# Patient Record
Sex: FEMALE | Race: WHITE | Employment: UNEMPLOYED | URBAN - METROPOLITAN AREA
[De-identification: names, ages, dates, MRNs, and addresses within clinical notes are randomized per-mention and may not be internally consistent; named-entity substitution may affect disease eponyms.]

---

## 2019-01-23 ENCOUNTER — OFFICE VISIT (OUTPATIENT)
Dept: NEUROLOGY | Age: 69
End: 2019-01-23
Payer: COMMERCIAL

## 2019-01-23 VITALS
WEIGHT: 287.8 LBS | SYSTOLIC BLOOD PRESSURE: 128 MMHG | DIASTOLIC BLOOD PRESSURE: 69 MMHG | HEIGHT: 63 IN | HEART RATE: 79 BPM | BODY MASS INDEX: 50.99 KG/M2

## 2019-01-23 DIAGNOSIS — R20.0 NUMBNESS AND TINGLING OF BOTH LEGS: ICD-10-CM

## 2019-01-23 DIAGNOSIS — M79.2 INTRACTABLE NEUROPATHIC PAIN OF LOWER EXTREMITY, UNSPECIFIED LATERALITY: ICD-10-CM

## 2019-01-23 DIAGNOSIS — R20.2 NUMBNESS AND TINGLING OF BOTH LEGS: ICD-10-CM

## 2019-01-23 DIAGNOSIS — R20.2 NUMBNESS AND TINGLING IN BOTH HANDS: Primary | ICD-10-CM

## 2019-01-23 DIAGNOSIS — R20.0 NUMBNESS AND TINGLING IN BOTH HANDS: Primary | ICD-10-CM

## 2019-01-23 DIAGNOSIS — E11.42 DIABETIC POLYNEUROPATHY ASSOCIATED WITH TYPE 2 DIABETES MELLITUS (HCC): ICD-10-CM

## 2019-01-23 PROCEDURE — 99204 OFFICE O/P NEW MOD 45 MIN: CPT | Performed by: PSYCHIATRY & NEUROLOGY

## 2019-01-23 RX ORDER — LORATADINE 10 MG/1
10 TABLET ORAL DAILY
COMMUNITY
End: 2019-09-24 | Stop reason: ALTCHOICE

## 2019-01-23 RX ORDER — TRAZODONE HYDROCHLORIDE 100 MG/1
100 TABLET ORAL NIGHTLY
COMMUNITY

## 2019-01-23 RX ORDER — POTASSIUM CHLORIDE 750 MG/1
10 CAPSULE, EXTENDED RELEASE ORAL DAILY
COMMUNITY

## 2019-01-23 RX ORDER — TOLTERODINE 2 MG/1
2 CAPSULE, EXTENDED RELEASE ORAL DAILY
COMMUNITY

## 2019-01-23 RX ORDER — SPIRONOLACTONE 50 MG/1
50 TABLET, FILM COATED ORAL 2 TIMES DAILY
COMMUNITY

## 2019-01-23 RX ORDER — GUAIFENESIN, PSEUDOEPHEDRINE HYDROCHLORIDE 600; 60 MG/1; MG/1
1 TABLET, EXTENDED RELEASE ORAL 2 TIMES DAILY PRN
COMMUNITY
End: 2019-09-24 | Stop reason: ALTCHOICE

## 2019-01-23 RX ORDER — FLUTICASONE PROPIONATE 50 MCG
1 SPRAY, SUSPENSION (ML) NASAL DAILY
COMMUNITY

## 2019-01-23 RX ORDER — OMEPRAZOLE 20 MG/1
20 CAPSULE, DELAYED RELEASE ORAL 2 TIMES DAILY
COMMUNITY

## 2019-01-23 RX ORDER — POLYETHYLENE GLYCOL 3350 17 G/17G
17 POWDER, FOR SOLUTION ORAL 2 TIMES DAILY
COMMUNITY

## 2019-01-23 RX ORDER — GABAPENTIN 800 MG/1
600 TABLET ORAL 3 TIMES DAILY
COMMUNITY
End: 2019-07-24 | Stop reason: SDUPTHER

## 2019-01-23 RX ORDER — GUAIFENESIN AND DEXTROMETHORPHAN HYDROBROMIDE 100; 10 MG/5ML; MG/5ML
5 SOLUTION ORAL EVERY 4 HOURS PRN
COMMUNITY
End: 2019-09-24 | Stop reason: ALTCHOICE

## 2019-01-23 RX ORDER — B-COMPLEX WITH VITAMIN C
TABLET ORAL DAILY
COMMUNITY

## 2019-01-23 RX ORDER — LOSARTAN POTASSIUM 100 MG/1
100 TABLET ORAL DAILY
COMMUNITY

## 2019-01-23 RX ORDER — ASPIRIN 81 MG/1
81 TABLET, CHEWABLE ORAL DAILY
COMMUNITY

## 2019-01-23 RX ORDER — DIPHENHYDRAMINE HCL 25 MG
25 CAPSULE ORAL DAILY
COMMUNITY
End: 2019-09-24

## 2019-01-23 RX ORDER — FUROSEMIDE 40 MG/1
40 TABLET ORAL DAILY
COMMUNITY

## 2019-01-23 RX ORDER — DOCUSATE SODIUM 100 MG/1
100 CAPSULE, LIQUID FILLED ORAL DAILY PRN
COMMUNITY

## 2019-01-23 RX ORDER — LANOLIN ALCOHOL/MO/W.PET/CERES
3 CREAM (GRAM) TOPICAL NIGHTLY
COMMUNITY

## 2019-01-23 RX ORDER — CYCLOBENZAPRINE HCL 10 MG
10 TABLET ORAL 3 TIMES DAILY PRN
COMMUNITY

## 2019-01-23 RX ORDER — ACETAMINOPHEN AND CODEINE PHOSPHATE 300; 30 MG/1; MG/1
30-300 TABLET ORAL EVERY 6 HOURS PRN
COMMUNITY

## 2019-01-23 RX ORDER — DULOXETIN HYDROCHLORIDE 30 MG/1
CAPSULE, DELAYED RELEASE ORAL
Qty: 30 CAPSULE | Refills: 0 | Status: SHIPPED | OUTPATIENT
Start: 2019-01-23 | End: 2019-04-26 | Stop reason: SDUPTHER

## 2019-01-23 RX ORDER — NYSTATIN 100000 [USP'U]/G
100000 POWDER TOPICAL 4 TIMES DAILY
COMMUNITY
End: 2019-09-24

## 2019-01-23 RX ORDER — ESCITALOPRAM OXALATE 5 MG/1
10 TABLET ORAL DAILY
COMMUNITY

## 2019-02-07 ENCOUNTER — OFFICE VISIT (OUTPATIENT)
Dept: NEUROLOGY | Age: 69
End: 2019-02-07
Payer: COMMERCIAL

## 2019-02-07 DIAGNOSIS — R20.0 NUMBNESS AND TINGLING IN BOTH HANDS: Primary | ICD-10-CM

## 2019-02-07 DIAGNOSIS — M79.602 PAIN IN BOTH UPPER EXTREMITIES: ICD-10-CM

## 2019-02-07 DIAGNOSIS — M79.601 PAIN IN BOTH UPPER EXTREMITIES: ICD-10-CM

## 2019-02-07 DIAGNOSIS — R20.2 NUMBNESS AND TINGLING IN BOTH HANDS: Primary | ICD-10-CM

## 2019-02-07 PROCEDURE — 95886 MUSC TEST DONE W/N TEST COMP: CPT | Performed by: PSYCHIATRY & NEUROLOGY

## 2019-02-07 PROCEDURE — 95912 NRV CNDJ TEST 11-12 STUDIES: CPT | Performed by: PSYCHIATRY & NEUROLOGY

## 2019-02-14 ENCOUNTER — TELEPHONE (OUTPATIENT)
Dept: NEUROLOGY | Age: 69
End: 2019-02-14

## 2019-04-26 ENCOUNTER — OFFICE VISIT (OUTPATIENT)
Dept: NEUROLOGY | Age: 69
End: 2019-04-26
Payer: COMMERCIAL

## 2019-04-26 VITALS
HEART RATE: 102 BPM | BODY MASS INDEX: 52.81 KG/M2 | DIASTOLIC BLOOD PRESSURE: 70 MMHG | SYSTOLIC BLOOD PRESSURE: 130 MMHG | WEIGHT: 287 LBS | HEIGHT: 62 IN

## 2019-04-26 DIAGNOSIS — E11.42 DIABETIC POLYNEUROPATHY ASSOCIATED WITH TYPE 2 DIABETES MELLITUS (HCC): Primary | ICD-10-CM

## 2019-04-26 DIAGNOSIS — G56.03 BILATERAL CARPAL TUNNEL SYNDROME: ICD-10-CM

## 2019-04-26 PROCEDURE — 2022F DILAT RTA XM EVC RTNOPTHY: CPT | Performed by: PSYCHIATRY & NEUROLOGY

## 2019-04-26 PROCEDURE — 99214 OFFICE O/P EST MOD 30 MIN: CPT | Performed by: PSYCHIATRY & NEUROLOGY

## 2019-04-26 PROCEDURE — G8400 PT W/DXA NO RESULTS DOC: HCPCS | Performed by: PSYCHIATRY & NEUROLOGY

## 2019-04-26 PROCEDURE — 4040F PNEUMOC VAC/ADMIN/RCVD: CPT | Performed by: PSYCHIATRY & NEUROLOGY

## 2019-04-26 PROCEDURE — G8417 CALC BMI ABV UP PARAM F/U: HCPCS | Performed by: PSYCHIATRY & NEUROLOGY

## 2019-04-26 PROCEDURE — G8427 DOCREV CUR MEDS BY ELIG CLIN: HCPCS | Performed by: PSYCHIATRY & NEUROLOGY

## 2019-04-26 PROCEDURE — 1123F ACP DISCUSS/DSCN MKR DOCD: CPT | Performed by: PSYCHIATRY & NEUROLOGY

## 2019-04-26 PROCEDURE — 3046F HEMOGLOBIN A1C LEVEL >9.0%: CPT | Performed by: PSYCHIATRY & NEUROLOGY

## 2019-04-26 PROCEDURE — 1036F TOBACCO NON-USER: CPT | Performed by: PSYCHIATRY & NEUROLOGY

## 2019-04-26 PROCEDURE — 1090F PRES/ABSN URINE INCON ASSESS: CPT | Performed by: PSYCHIATRY & NEUROLOGY

## 2019-04-26 PROCEDURE — 3017F COLORECTAL CA SCREEN DOC REV: CPT | Performed by: PSYCHIATRY & NEUROLOGY

## 2019-04-26 RX ORDER — DULOXETIN HYDROCHLORIDE 60 MG/1
CAPSULE, DELAYED RELEASE ORAL
Qty: 30 CAPSULE | Refills: 1 | Status: SHIPPED | OUTPATIENT
Start: 2019-04-26 | End: 2019-07-24 | Stop reason: SDUPTHER

## 2019-04-26 RX ORDER — PETROLATUM,WHITE/LANOLIN
OINTMENT (GRAM) TOPICAL 2 TIMES DAILY
COMMUNITY

## 2019-04-26 RX ORDER — NAPROXEN 375 MG/1
375 TABLET, DELAYED RELEASE ORAL 2 TIMES DAILY WITH MEALS
COMMUNITY

## 2019-04-26 NOTE — PROGRESS NOTES
NEUROLOGY FOLLOW UP VISIT  Patient Name:       Ramon Vieyra  :        1950  Clinic Visit Date:    2019    Dear Dr. Kat Conklin, DO     I saw Ms. Ramon Vieyra in follow-up in the office today in continuation of neurologic care. As you know she  is a 79 y.o. right handed  female initially seen in neurology consultation on 19 for c/o  numbness and tingling along with pain in lower extremities extending upwards including both hands right greater than left for the past \"patient couple of years\". She comes to the clinic today for follow up on EMG study of bilateral lower extremities. She also has been having increasing nocturnal awakenings. She has had carpal tunnel release in the past along with the left ulnar nerve transposition. Also admits to neck and lower back pain but denied radiating pain along upper or lower extremities. She also stated that she was diagnosed with diabetic neuropathy 16 years ago and she had insulin-dependent diabetes since early . She has been on gabapentin 800 mg tid with no relief. She tried Lyrica in the past without any help. she is presently on Tylenol # 3 with \"some help\". She also has been having spasms/ cramps in both calves and Flexeril gives her moderate relief. She also has been having progressive balance difficulties and she had multiple falls. Denied head injuries and loss of consciousness. She also c/o intermittent jerking sensation involving both upper and lower extremities and at times associated with increased sleepiness. Denied syncopal episodes. Review of systems done by staff reviewed and pertinent positives include numbness, painful sensations, joint pain, neck pain, back pain, balance difficulties, anxiety and depression. Denies suicidal ideation.     Current Outpatient Medications on File Prior to Visit   Medication Sig Dispense Refill    folic acid-pyridoxine-cyanocobalamine (FOLTX) 2.5-25-1 MG TABS tablet Take 1

## 2019-04-26 NOTE — PROGRESS NOTES
NEUROLOGY FOLLOW-UP  Patient Name:  Lucian Victoria  :   1950  Clinic Visit Date: 2019        REVIEW OF SYSTEMS  Constitutional Weight changes: present, Fevers : present, Fatigue: present; Any recent hospitalizations:  present.    HEENT Ears: pain, ringing, diminished Eyes: glasses, Visual disturbance: present   Reespiratory Shortness of breath: present, Cough: present   Cardivascular Chest pain: present, Leg swelling :present   GI Constipation: present, Diarrhea: absent, Swallowing change: present    Urinary frequency: present, Urinary urgency: present, Urinary incontinence: absent   Musculoskeletal Neck pain: present, Back pain: present, Stiffness: present, Muscle pain: present, Joint pain: present   Dermatological Hair loss: absent, Skin changes: absent   Neurological Memory loss: present, Confusion: present, Seizures: absent; Trouble walking or imbalance: present, Dizziness: present, Weakness: present, Numbness present, Tremor: absent, Spasm: present, Speech difficulty: present, Headache: present, Light sensitivity: present   Psychiatric Anxiety: present, Depression  present, Suicidal ideations absent   Hematologic Abnormal bleeding: absent, Anemia: absent, Clotting disorder: absent, Lymph gland changes: absent

## 2019-07-24 ENCOUNTER — TELEPHONE (OUTPATIENT)
Dept: NEUROLOGY | Age: 69
End: 2019-07-24

## 2019-07-24 ENCOUNTER — OFFICE VISIT (OUTPATIENT)
Dept: NEUROLOGY | Age: 69
End: 2019-07-24
Payer: COMMERCIAL

## 2019-07-24 VITALS
HEART RATE: 89 BPM | BODY MASS INDEX: 53.92 KG/M2 | WEIGHT: 293 LBS | HEIGHT: 62 IN | SYSTOLIC BLOOD PRESSURE: 125 MMHG | DIASTOLIC BLOOD PRESSURE: 67 MMHG

## 2019-07-24 DIAGNOSIS — R20.2 NUMBNESS AND TINGLING IN BOTH HANDS: ICD-10-CM

## 2019-07-24 DIAGNOSIS — R20.0 NUMBNESS AND TINGLING OF BOTH LEGS: ICD-10-CM

## 2019-07-24 DIAGNOSIS — G56.03 BILATERAL CARPAL TUNNEL SYNDROME: ICD-10-CM

## 2019-07-24 DIAGNOSIS — R20.0 NUMBNESS AND TINGLING IN BOTH HANDS: ICD-10-CM

## 2019-07-24 DIAGNOSIS — E11.42 DIABETIC POLYNEUROPATHY ASSOCIATED WITH TYPE 2 DIABETES MELLITUS (HCC): Primary | ICD-10-CM

## 2019-07-24 DIAGNOSIS — M79.2 INTRACTABLE NEUROPATHIC PAIN OF LOWER EXTREMITY, UNSPECIFIED LATERALITY: ICD-10-CM

## 2019-07-24 DIAGNOSIS — R20.2 NUMBNESS AND TINGLING OF BOTH LEGS: ICD-10-CM

## 2019-07-24 PROCEDURE — 99214 OFFICE O/P EST MOD 30 MIN: CPT | Performed by: PSYCHIATRY & NEUROLOGY

## 2019-07-24 PROCEDURE — G8427 DOCREV CUR MEDS BY ELIG CLIN: HCPCS | Performed by: PSYCHIATRY & NEUROLOGY

## 2019-07-24 PROCEDURE — 1123F ACP DISCUSS/DSCN MKR DOCD: CPT | Performed by: PSYCHIATRY & NEUROLOGY

## 2019-07-24 PROCEDURE — 2022F DILAT RTA XM EVC RTNOPTHY: CPT | Performed by: PSYCHIATRY & NEUROLOGY

## 2019-07-24 PROCEDURE — G8417 CALC BMI ABV UP PARAM F/U: HCPCS | Performed by: PSYCHIATRY & NEUROLOGY

## 2019-07-24 PROCEDURE — 3046F HEMOGLOBIN A1C LEVEL >9.0%: CPT | Performed by: PSYCHIATRY & NEUROLOGY

## 2019-07-24 PROCEDURE — G8400 PT W/DXA NO RESULTS DOC: HCPCS | Performed by: PSYCHIATRY & NEUROLOGY

## 2019-07-24 PROCEDURE — 3017F COLORECTAL CA SCREEN DOC REV: CPT | Performed by: PSYCHIATRY & NEUROLOGY

## 2019-07-24 PROCEDURE — 4040F PNEUMOC VAC/ADMIN/RCVD: CPT | Performed by: PSYCHIATRY & NEUROLOGY

## 2019-07-24 PROCEDURE — 1090F PRES/ABSN URINE INCON ASSESS: CPT | Performed by: PSYCHIATRY & NEUROLOGY

## 2019-07-24 PROCEDURE — 1036F TOBACCO NON-USER: CPT | Performed by: PSYCHIATRY & NEUROLOGY

## 2019-07-24 RX ORDER — DULOXETIN HYDROCHLORIDE 60 MG/1
CAPSULE, DELAYED RELEASE ORAL
Qty: 30 CAPSULE | Refills: 1 | Status: SHIPPED | OUTPATIENT
Start: 2019-07-24 | End: 2019-07-30

## 2019-07-24 RX ORDER — BUPROPION HYDROCHLORIDE 300 MG/1
300 TABLET ORAL EVERY MORNING
COMMUNITY

## 2019-07-24 RX ORDER — MECLIZINE HYDROCHLORIDE 25 MG/1
25 TABLET ORAL 3 TIMES DAILY PRN
COMMUNITY

## 2019-07-24 RX ORDER — GABAPENTIN 600 MG/1
TABLET ORAL
Qty: 90 TABLET | Refills: 3 | Status: SHIPPED | OUTPATIENT
Start: 2019-07-24 | End: 2020-02-11 | Stop reason: SDUPTHER

## 2019-07-24 RX ORDER — PREDNISONE 1 MG/1
5 TABLET ORAL DAILY
COMMUNITY

## 2019-07-24 RX ORDER — TERBINAFINE HYDROCHLORIDE 250 MG/1
250 TABLET ORAL DAILY
COMMUNITY
End: 2019-09-24

## 2019-07-24 NOTE — PROGRESS NOTES
Dispense Refill    meclizine (ANTIVERT) 25 MG tablet Take 25 mg by mouth 3 times daily as needed      methotrexate (RHEUMATREX) 2.5 MG chemo tablet Take 2.5 mg by mouth once a week Indications: take six tablets once weekly      predniSONE (DELTASONE) 5 MG tablet Take 5 mg by mouth 3 times daily (with meals)      terbinafine (LAMISIL) 250 MG tablet Take 250 mg by mouth daily      buPROPion (WELLBUTRIN XL) 300 MG extended release tablet Take 300 mg by mouth every morning      folic acid-pyridoxine-cyanocobalamine (FOLTX) 2.5-25-1 MG TABS tablet Take 1 tablet by mouth daily      sodium chloride (OCEAN) 0.65 % nasal spray 1 spray by Nasal route as needed for Congestion      Naproxen (EC NAPROSYN) 375 MG EC tablet Take 375 mg by mouth 2 times daily (with meals)      insulin glargine (BASAGLAR KWIKPEN) 100 UNIT/ML injection pen Inject into the skin nightly      DULoxetine (CYMBALTA) 60 MG extended release capsule Take one cap once daily 30 capsule 1    aspirin 81 MG tablet Take 81 mg by mouth daily      cyclobenzaprine (FLEXERIL) 10 MG tablet Take 10 mg by mouth 3 times daily as needed      escitalopram (LEXAPRO) 5 MG tablet Take 5 mg by mouth daily      fluticasone (FLONASE) 50 MCG/ACT nasal spray 50 sprays by Nasal route 2 times daily      furosemide (LASIX) 40 MG tablet Take 40 mg by mouth daily      gabapentin (NEURONTIN) 800 MG tablet Take 600 mg by mouth 3 times daily.        insulin lispro (HUMALOG KWIKPEN) 100 UNIT/ML pen Inject 100 Units into the skin 2 times daily      adalimumab (HUMIRA PEN) 40 MG/0.8ML injection Inject 0.8 mLs into the skin daily      losartan (COZAAR) 100 MG tablet Take 100 mg by mouth daily      polyethylene glycol (GLYCOLAX) powder Take 17 g by mouth daily      nystatin (NYAMYC) 589325 UNIT/GM powder Apply 100,000 g topically 2 times daily      omeprazole 20 MG EC tablet Take 20 mg by mouth 2 times daily      potassium chloride (MICRO-K) 10 MEQ extended release capsule Take 10 mEq by mouth daily      spironolactone (ALDACTONE) 50 MG tablet Take 50 mg by mouth 2 times daily      traZODone (DESYREL) 100 MG tablet Take 100 mg by mouth daily      acetaminophen-codeine (TYLENOL #3) 300-30 MG per tablet Take  mg by mouth every 6 hours as needed. Óscar Mendez Liraglutide (VICTOZA) 18 MG/3ML SOPN SC injection Inject 18 mg into the skin daily      diclofenac sodium 1 % GEL Apply 1 g topically 2 times daily      loratadine (CLARITIN) 10 MG tablet Take 10 mg by mouth daily      B Complex Vitamins (VITAMIN B COMPLEX) TABS Take by mouth daily      Dextromethorphan-guaiFENesin  MG/5ML SYRP Take 5 mLs by mouth every 4 hours as needed for Cough      docusate sodium (COLACE) 100 MG capsule Take 100 mg by mouth daily as needed for Constipation      melatonin 3 MG TABS tablet Take 3 mg by mouth nightly as needed      pseudoephedrine-guaiFENesin (MUCINEX D)  MG per extended release tablet Take 1 tablet by mouth 2 times daily as needed for Congestion      Vitamins A & D (VITAMIN A & D) ointment Apply topically as needed for Dry Skin Apply topically as needed.  diphenhydrAMINE (BANOPHEN) 25 MG capsule Take 25 mg by mouth daily      tolterodine (DETROL LA) 2 MG extended release capsule Take 2 mg by mouth daily       No current facility-administered medications on file prior to visit. Allergies: Maribel Reyes is allergic to adhesive tape; alcohol; benzoyl peroxide; metformin and related; penicillins; seasonal; vancomycin; and vicodin [hydrocodone-acetaminophen].     Past Medical History:   Diagnosis Date    Acute hypokalemia     Anxiety     Chronic pain syndrome     Esophageal reflux     Hypertension     Insomnia     Major depressive disorder     Melena     Mumps 03/2019    Osteoporosis     bilateral knees    Overactive bladder     Psoriasis     Psoriasis     STEMI (ST elevation myocardial infarction) (HCC)     Type 2 diabetes mellitus ([facility] [address]        History reviewed. No pertinent surgical history. Social History: Marshal Welch  reports that she quit smoking about 7 years ago. She has never used smokeless tobacco. She reports that she does not drink alcohol or use drugs. Family History   Problem Relation Age of Onset    Hypertension Mother     Heart Disease Mother    Keary Roup Migraines Mother     Hypertension Father     Heart Disease Father     Hypertension Sister     Heart Disease Sister     Migraines Sister     Hypertension Brother     Heart Disease Brother     Cancer Brother     Migraines Maternal Grandmother     Hypertension Maternal Grandmother     Heart Disease Maternal Grandmother     Migraines Maternal Grandfather     Hypertension Maternal Grandfather     Heart Disease Maternal Grandfather        On exam: Blood pressure 125/67, pulse 89, height 5' 2\" (1.575 m), weight 294 lb 6.4 oz (133.5 kg). GENERAL  Appears comfortable and in no distress   HEENT  NC/ AT   NECK  Supple and no bruits heard   MENTAL STATUS:  Alert, oriented, intact memory, no confusion, normal speech, normal language, no hallucination or delusion   CRANIAL NERVES: II     -      PERRLA, Fundi reveal intact venous pulsations;  Visual fields intact to confrontation  III,IV,VI -  EOMs full, no afferent defect, no                      ELIS, no ptosis  V     -     Normal facial sensation  VII    -     Normal facial symmetry  VIII   -     Intact hearing  IX,X -     Symmetrical palate  XI    -     Symmetrical shoulder shrug  XII   -     Midline tongue, no atrophy    MOTOR FUNCTION:  significant for good strength of grade 5/5 in bilateral proximal and distal muscle groups of both upper and lower extremities with normal bulk, normal tone and no involuntary movements, no tremor   SENSORY FUNCTION:  Impaired light touch, pinprick in distal lower extremities and upper extremities in stocking - global pattern    CEREBELLAR FUNCTION:  Intact fine motor control over upper limbs   REFLEX FUNCTION:

## 2019-09-24 ENCOUNTER — TELEPHONE (OUTPATIENT)
Dept: NEUROLOGY | Age: 69
End: 2019-09-24

## 2019-09-24 ENCOUNTER — OFFICE VISIT (OUTPATIENT)
Dept: NEUROLOGY | Age: 69
End: 2019-09-24
Payer: COMMERCIAL

## 2019-09-24 ENCOUNTER — HOSPITAL ENCOUNTER (EMERGENCY)
Age: 69
Discharge: HOME | End: 2019-09-24
Attending: EMERGENCY MEDICINE
Payer: COMMERCIAL

## 2019-09-24 VITALS
WEIGHT: 293 LBS | BODY MASS INDEX: 53.92 KG/M2 | HEART RATE: 79 BPM | SYSTOLIC BLOOD PRESSURE: 130 MMHG | DIASTOLIC BLOOD PRESSURE: 64 MMHG | HEIGHT: 62 IN

## 2019-09-24 VITALS
SYSTOLIC BLOOD PRESSURE: 130 MMHG | TEMPERATURE: 98.2 F | WEIGHT: 293 LBS | BODY MASS INDEX: 53.92 KG/M2 | RESPIRATION RATE: 20 BRPM | DIASTOLIC BLOOD PRESSURE: 54 MMHG | OXYGEN SATURATION: 95 % | HEART RATE: 100 BPM | HEIGHT: 62 IN

## 2019-09-24 DIAGNOSIS — I82.412 ACUTE DEEP VEIN THROMBOSIS (DVT) OF FEMORAL VEIN OF LEFT LOWER EXTREMITY (HCC): Primary | ICD-10-CM

## 2019-09-24 DIAGNOSIS — R20.0 NUMBNESS AND TINGLING IN BOTH HANDS: ICD-10-CM

## 2019-09-24 DIAGNOSIS — M79.2 INTRACTABLE NEUROPATHIC PAIN OF LOWER EXTREMITY, UNSPECIFIED LATERALITY: Primary | ICD-10-CM

## 2019-09-24 DIAGNOSIS — E11.42 DIABETIC POLYNEUROPATHY ASSOCIATED WITH TYPE 2 DIABETES MELLITUS (HCC): ICD-10-CM

## 2019-09-24 DIAGNOSIS — R20.2 NUMBNESS AND TINGLING IN BOTH HANDS: ICD-10-CM

## 2019-09-24 DIAGNOSIS — G56.03 BILATERAL CARPAL TUNNEL SYNDROME: ICD-10-CM

## 2019-09-24 LAB
ABSOLUTE EOS #: 0.04 K/UL (ref 0–0.44)
ABSOLUTE IMMATURE GRANULOCYTE: 0.19 K/UL (ref 0–0.3)
ABSOLUTE LYMPH #: 1.24 K/UL (ref 1.1–3.7)
ABSOLUTE MONO #: 0.94 K/UL (ref 0.1–1.2)
ANION GAP SERPL CALCULATED.3IONS-SCNC: 14 MMOL/L (ref 9–17)
BASOPHILS # BLD: 0 % (ref 0–2)
BASOPHILS ABSOLUTE: 0.03 K/UL (ref 0–0.2)
BUN BLDV-MCNC: 25 MG/DL (ref 8–23)
BUN/CREAT BLD: 28 (ref 9–20)
CALCIUM SERPL-MCNC: 9.4 MG/DL (ref 8.6–10.4)
CHLORIDE BLD-SCNC: 100 MMOL/L (ref 98–107)
CO2: 25 MMOL/L (ref 20–31)
CREAT SERPL-MCNC: 0.9 MG/DL (ref 0.5–0.9)
DIFFERENTIAL TYPE: ABNORMAL
EOSINOPHILS RELATIVE PERCENT: 0 % (ref 1–4)
GFR AFRICAN AMERICAN: >60 ML/MIN
GFR NON-AFRICAN AMERICAN: >60 ML/MIN
GFR SERPL CREATININE-BSD FRML MDRD: ABNORMAL ML/MIN/{1.73_M2}
GFR SERPL CREATININE-BSD FRML MDRD: ABNORMAL ML/MIN/{1.73_M2}
GLUCOSE BLD-MCNC: 252 MG/DL (ref 70–99)
HCT VFR BLD CALC: 41.3 % (ref 36.3–47.1)
HEMOGLOBIN: 13 G/DL (ref 11.9–15.1)
IMMATURE GRANULOCYTES: 2 %
LYMPHOCYTES # BLD: 10 % (ref 24–43)
MCH RBC QN AUTO: 33.4 PG (ref 25.2–33.5)
MCHC RBC AUTO-ENTMCNC: 31.5 G/DL (ref 28.4–34.8)
MCV RBC AUTO: 106.2 FL (ref 82.6–102.9)
MONOCYTES # BLD: 7 % (ref 3–12)
NRBC AUTOMATED: ABNORMAL PER 100 WBC
PDW BLD-RTO: 15.8 % (ref 11.8–14.4)
PLATELET # BLD: 167 K/UL (ref 138–453)
PLATELET ESTIMATE: ABNORMAL
PMV BLD AUTO: 11.5 FL (ref 8.1–13.5)
POTASSIUM SERPL-SCNC: 5 MMOL/L (ref 3.7–5.3)
RBC # BLD: 3.89 M/UL (ref 3.95–5.11)
RBC # BLD: ABNORMAL 10*6/UL
SEG NEUTROPHILS: 81 % (ref 36–65)
SEGMENTED NEUTROPHILS ABSOLUTE COUNT: 10.56 K/UL (ref 1.5–8.1)
SODIUM BLD-SCNC: 139 MMOL/L (ref 135–144)
WBC # BLD: 13 K/UL (ref 3.5–11.3)
WBC # BLD: ABNORMAL 10*3/UL

## 2019-09-24 PROCEDURE — 80048 BASIC METABOLIC PNL TOTAL CA: CPT

## 2019-09-24 PROCEDURE — 2022F DILAT RTA XM EVC RTNOPTHY: CPT | Performed by: PSYCHIATRY & NEUROLOGY

## 2019-09-24 PROCEDURE — 85025 COMPLETE CBC W/AUTO DIFF WBC: CPT

## 2019-09-24 PROCEDURE — 6370000000 HC RX 637 (ALT 250 FOR IP): Performed by: EMERGENCY MEDICINE

## 2019-09-24 PROCEDURE — 99214 OFFICE O/P EST MOD 30 MIN: CPT | Performed by: PSYCHIATRY & NEUROLOGY

## 2019-09-24 PROCEDURE — 1090F PRES/ABSN URINE INCON ASSESS: CPT | Performed by: PSYCHIATRY & NEUROLOGY

## 2019-09-24 PROCEDURE — 3017F COLORECTAL CA SCREEN DOC REV: CPT | Performed by: PSYCHIATRY & NEUROLOGY

## 2019-09-24 PROCEDURE — 1123F ACP DISCUSS/DSCN MKR DOCD: CPT | Performed by: PSYCHIATRY & NEUROLOGY

## 2019-09-24 PROCEDURE — G8417 CALC BMI ABV UP PARAM F/U: HCPCS | Performed by: PSYCHIATRY & NEUROLOGY

## 2019-09-24 PROCEDURE — 6370000000 HC RX 637 (ALT 250 FOR IP): Performed by: PHYSICIAN ASSISTANT

## 2019-09-24 PROCEDURE — 99284 EMERGENCY DEPT VISIT MOD MDM: CPT

## 2019-09-24 PROCEDURE — 1036F TOBACCO NON-USER: CPT | Performed by: PSYCHIATRY & NEUROLOGY

## 2019-09-24 PROCEDURE — 4040F PNEUMOC VAC/ADMIN/RCVD: CPT | Performed by: PSYCHIATRY & NEUROLOGY

## 2019-09-24 PROCEDURE — G8427 DOCREV CUR MEDS BY ELIG CLIN: HCPCS | Performed by: PSYCHIATRY & NEUROLOGY

## 2019-09-24 PROCEDURE — 3046F HEMOGLOBIN A1C LEVEL >9.0%: CPT | Performed by: PSYCHIATRY & NEUROLOGY

## 2019-09-24 PROCEDURE — G8400 PT W/DXA NO RESULTS DOC: HCPCS | Performed by: PSYCHIATRY & NEUROLOGY

## 2019-09-24 RX ORDER — TRIAMCINOLONE ACETONIDE 1 MG/G
CREAM TOPICAL 2 TIMES DAILY
COMMUNITY

## 2019-09-24 RX ORDER — FOLIC ACID 1 MG/1
1 TABLET ORAL DAILY
COMMUNITY

## 2019-09-24 RX ORDER — PROPRANOLOL HYDROCHLORIDE 40 MG/1
40 TABLET ORAL DAILY PRN
COMMUNITY

## 2019-09-24 RX ORDER — FLUCONAZOLE 100 MG/1
100 TABLET ORAL DAILY
COMMUNITY

## 2019-09-24 RX ORDER — NYSTATIN 100000 U/G
CREAM TOPICAL 2 TIMES DAILY
COMMUNITY

## 2019-09-24 RX ORDER — MECLIZINE HCL 12.5 MG/1
25 TABLET ORAL ONCE
Status: COMPLETED | OUTPATIENT
Start: 2019-09-24 | End: 2019-09-24

## 2019-09-24 RX ORDER — LORATADINE 10 MG/1
10 TABLET ORAL DAILY
COMMUNITY

## 2019-09-24 RX ADMIN — RIVAROXABAN 15 MG: 15 TABLET, FILM COATED ORAL at 13:22

## 2019-09-24 RX ADMIN — MECLIZINE 25 MG: 12.5 TABLET ORAL at 13:22

## 2019-09-24 ASSESSMENT — PAIN SCALES - GENERAL: PAINLEVEL_OUTOF10: 9

## 2019-09-24 NOTE — ED PROVIDER NOTES
EMERGENCY DEPARTMENT ENCOUNTER   ATTENDING ATTESTATION     Pt Name: Diego Iglesias  MRN: [id number]  [place] 1950  Date of evaluation: 9/24/19   Diego Iglesias is a 81 y.o. female with CC: Leg Pain (DVT in left leg; sent from [facility])    MDM:   Presents for DVT in the left leg. Had appointment with Dr Mariam Hay for worsening leg pain. Follows with him for DM neuropathy. Notified by Nursing Home just before leaving that she was positive for DVT. Has chronic SOB without change. Previously on Coumadin. Taken off due to recurrent toxicity (elevated INR). Denies h/o major GI bleed. B/L lower leg tenderness with distal erythema. More tenderness on left. Received pre-arrival notification by Dr Mariam Hay. He d/w patient's PCP. Recommended evaluation in nearest ED. Outside venous duplex US report obtained. Positive for acute DVT in left superficial femoral vein. No DVT on right. Denies contraindication to anticoagulation. Consider DOAC if covered by Insurance. Pharmacy consult placed. Patient released on Xarelto. Risks and benefits d/w patient and family member         CRITICAL CARE:       EKG: All EKG's are interpreted by the Emergency Department Physician who either signs or Co-signs this chart in the absence of a cardiologist.      RADIOLOGY:All plain film, CT, MRI, and formal ultrasound images (except ED bedside ultrasound) are read by the radiologist, see reports below, unless otherwise noted in MDM or here. No orders to display     LABS: All lab results were reviewed by myself, and all abnormals are listed below.   Labs Reviewed   CBC WITH AUTO DIFFERENTIAL - Abnormal; Notable for the following components:       Result Value    WBC 13.0 (*)     RBC 3.89 (*)     .2 (*)     RDW 15.8 (*)     Seg Neutrophils 81 (*)     Lymphocytes 10 (*)     Eosinophils % 0 (*)     Immature Granulocytes 2 (*)     Segs Absolute 10.56 (*)     All other components within normal limits   BASIC METABOLIC PANEL - Abnormal;
mouth 2 times daily     POLYETHYLENE GLYCOL (GLYCOLAX) POWDER    Take 17 g by mouth daily    POTASSIUM CHLORIDE (MICRO-K) 10 MEQ EXTENDED RELEASE CAPSULE    Take 10 mEq by mouth daily    PREDNISONE (DELTASONE) 5 MG TABLET    Take 10 mg by mouth daily     PROPRANOLOL (INDERAL) 40 MG TABLET    Take 40 mg by mouth daily as needed (For tremors) For tremors    SODIUM CHLORIDE (OCEAN) 0.65 % NASAL SPRAY    1 spray by Nasal route as needed for Congestion    SPIRONOLACTONE (ALDACTONE) 50 MG TABLET    Take 50 mg by mouth 2 times daily    TOLTERODINE (DETROL LA) 2 MG EXTENDED RELEASE CAPSULE    Take 2 mg by mouth daily    TRAZODONE (DESYREL) 100 MG TABLET    Take 100 mg by mouth nightly     TRIAMCINOLONE (KENALOG) 0.1 % CREAM    Apply topically 2 times daily Apply topically 2 times daily under breast and skin folds. Mix with nystatin cream.    VITAMINS A & D (VITAMIN A & D) OINTMENT    Apply topically 2 times daily Apply to arms and back       PAST MEDICAL HISTORY         Diagnosis Date    Acute hypokalemia     Anxiety     Chronic pain syndrome     Esophageal reflux     Hypertension     Insomnia     Major depressive disorder     Melena     Mumps 03/2019    Osteoporosis     bilateral knees    Overactive bladder     Psoriasis     Psoriasis     [facility]     Type 2 diabetes mellitus ([facility] [address]        SURGICAL HISTORY     History reviewed. No pertinent surgical history.       FAMILY HISTORY           Problem Relation Age of Onset    Hypertension Mother     Heart Disease Mother    [facility] Migraines Mother     Hypertension Father     Heart Disease Father     Hypertension Sister     Heart Disease Sister     Migraines Sister     Hypertension Brother     Heart Disease Brother     Cancer Brother     Migraines Maternal Grandmother     Hypertension Maternal Grandmother     Heart Disease Maternal Grandmother     Migraines Maternal Grandfather     Hypertension Maternal Grandfather

## 2019-09-24 NOTE — PROGRESS NOTES
NEUROLOGY FOLLOW UP VISIT  Patient Name:       Jw Anthony  :        1950  Clinic Visit Date:    2019    Dear Dr. Kaushal Peña, DO     I saw Ms. Jw Anthony in follow-up in the office today in continuation of neurologic care. As you know she  is a 81 y.o. right handed  female with uncontrolled diabetic neuropathic pain. Since the last visit on 19, spoke to nurse, Megha Garcia at [facility]. Patient was hospitalized in [facility] in 2019 for suicidal ideations. Since then she has not been getting Duloxetine. She is brought to the clinic today for bilateral lower extremity weakness. She had doppler of lower extremities and this am just before coming to this clinic she was told to have DVT in left lower extremity. She was just told about it and she did not bring the report with her. She also stated that she had allergic rash (without SOB) with use of amitriptyline and baclofen and ketamine cream and she used it only for one day and immediately stopped using it. Since then rash has been improving. She also stated that she has neck pain and she is on schedule for MRI cervical spine (w/wo) at [facility] on 19. Of note; she was initially seen here in neurology consultation on 19 for c/o  numbness and tingling along with pain in lower extremities extending upwards including both hands right greater than left for the past \"patient couple of years\". She comes to clinic stating that gabapentin and flexeril has been helping some for her abnormal pain in bilateral lower extremities. Also admits to neck and lower back pain but denied radiating pain along upper or lower extremities. She also stated that she was diagnosed with diabetic neuropathy 16 years ago and she had insulin-dependent diabetes since early . She has been on gabapentin 800 mg tid with no relief.   She tried Lyrica in the past without any help. she is presently on Tylenol # 3 with times daily as needed      escitalopram (LEXAPRO) 5 MG tablet Take 5 mg by mouth daily      furosemide (LASIX) 40 MG tablet Take 40 mg by mouth daily      insulin lispro (HUMALOG KWIKPEN) 100 UNIT/ML pen Inject 100 Units into the skin 2 times daily      adalimumab (HUMIRA PEN) 40 MG/0.8ML injection Inject 0.8 mLs into the skin daily      polyethylene glycol (GLYCOLAX) powder Take 17 g by mouth daily      nystatin (NYAMYC) 891147 UNIT/GM powder Apply 100,000 g topically 2 times daily      omeprazole 20 MG EC tablet Take 20 mg by mouth 2 times daily      potassium chloride (MICRO-K) 10 MEQ extended release capsule Take 10 mEq by mouth daily      spironolactone (ALDACTONE) 50 MG tablet Take 50 mg by mouth 2 times daily      traZODone (DESYREL) 100 MG tablet Take 100 mg by mouth daily      acetaminophen-codeine (TYLENOL #3) 300-30 MG per tablet Take  mg by mouth every 6 hours as needed. Lucas Polanco Liraglutide (VICTOZA) 18 MG/3ML SOPN SC injection Inject 18 mg into the skin daily      diclofenac sodium 1 % GEL Apply 1 g topically 2 times daily      B Complex Vitamins (VITAMIN B COMPLEX) TABS Take by mouth daily      docusate sodium (COLACE) 100 MG capsule Take 100 mg by mouth daily as needed for Constipation      melatonin 3 MG TABS tablet Take 3 mg by mouth nightly as needed      meclizine (ANTIVERT) 25 MG tablet Take 25 mg by mouth 3 times daily as needed      terbinafine (LAMISIL) 250 MG tablet Take 250 mg by mouth daily      folic acid-pyridoxine-cyanocobalamine (FOLTX) 2.5-25-1 MG TABS tablet Take 1 tablet by mouth daily      diphenhydrAMINE (BANOPHEN) 25 MG capsule Take 25 mg by mouth daily      fluticasone (FLONASE) 50 MCG/ACT nasal spray 50 sprays by Nasal route 2 times daily      losartan (COZAAR) 100 MG tablet Take 100 mg by mouth daily      tolterodine (DETROL LA) 2 MG extended release capsule Take 2 mg by mouth daily       No current facility-administered medications on file prior to sensation  VII    -     Normal facial symmetry  VIII   -     Intact hearing  IX,X -     Symmetrical palate  XI    -     Symmetrical shoulder shrug  XII   -     Midline tongue, no atrophy    MOTOR FUNCTION:  significant for limited motor exam in bilateral lower extremities due to tenderness in both lower legs; otherwise good strength of grade 5/5 in bilateral proximal and distal muscle groups of both upper extremities with normal bulk, normal tone and no involuntary movements, no tremor   SENSORY FUNCTION:  Impaired light touch, pinprick in distal lower extremities and upper extremities in stocking - global pattern    CEREBELLAR FUNCTION:  Intact fine motor control over upper limbs   REFLEX FUNCTION:  Symmetric 1+ DTRs in both UE; hypoactive DTRS at both knees and absent ankle jerks bilaterally   STATION and GAIT  wide based gait; could not do tandem gait; able to walk using walker. EMG bilateral UE (2/7/19): There is evidence of median neuropathy at both wrists suggestive of bilateral carpal tunnel syndrome of mild severity, right > left. This study did not demonstrate any electrodiagnostic evidence of cervical radiculopathy or brachial plexopathy in the nerves and muscles tested. Clinical correlation is recommended          Impression and Plan: Ms. Dorothy Morales is a 81 y.o. female with   Acute symptomatic DVT; reportedly patient was told that she had DVT in left lower extremity; did not bring the report. Spoke to patient's PCP, Dr. Celedonio Boeck and he prefers patient to be seen in nearest ED and treated accordingly. Marginally controlled diabetic neuropathy pain: Continue gabapentin 800 mg tid along with Flexeril. Hx of rash with topical cream, with amitriptyline and baclofen and ketamine; used only for one day and stopped using it. Bilateral CTS: Scripts for bilateral wrist splints given.      Given the hx of suicidal ideations; she should not take Duloxetine and patient very clearly understood

## 2019-09-24 NOTE — LETTER
[id number] [facility] Neurology Specialist  [address] [place] [id number]  Phone: [phone]  Fax: [phone]    Baljeet Barahona MD        2019     Marisol Muñoz, [address] [place] [id number]    Patient: Clotilde Aguilar  MR Number: [id number]  YOB: 1950  Date of Visit: 2019    Dear Dr. Marisol Muñoz: Thank you for the request for consultation for Marily Dennis to me for the evaluation of [address]  Below are the relevant portions of my assessment and plan of care. NEUROLOGY FOLLOW-UP  Patient Name:  Clotilde Aguilar  :   1950  Clinic Visit Date: 2019        REVIEW OF SYSTEMS  Constitutional Weight changes: present, Fevers : absent, Fatigue: present; Any recent hospitalizations:  absent.    HEENT Ears: pain, ringing, loss, Eyes: N/A, Visual disturbance: present   Reespiratory Shortness of breath: present, Cough: present   Cardivascular Chest pain: absent, Leg swelling :present   GI Constipation: present, Diarrhea: absent, Swallowing change: present    Urinary frequency: present, Urinary urgency: present, Urinary incontinence: absent   Musculoskeletal Neck pain: present, Back pain: present, Stiffness: present, Muscle pain: present, Joint pain: present   Dermatological Hair loss: absent, Skin changes: present   Neurological Memory loss: present, Confusion: present, Seizures: absent; Trouble walking or imbalance: present, Dizziness: present, Weakness: present, Numbness present, Tremor: present, Spasm: absent, Speech difficulty: present, Headache: present, Light sensitivity: present   Psychiatric Anxiety: present, Depression  absent, Suicidal ideations absent Depression: present   Hematologic Abnormal bleeding: absent, Anemia: absent, Clotting disorder: absent, Lymph gland changes: absent           NEUROLOGY FOLLOW UP VISIT  Patient Name:       Clotilde Aguilar  :        1950  Clinic Visit Date:    2019 peroxide; metformin and related; penicillins; seasonal; vancomycin; and vicodin [hydrocodone-acetaminophen]. Past Medical History:   Diagnosis Date    Acute hypokalemia     Anxiety     Chronic pain syndrome     Esophageal reflux     Hypertension     Insomnia     Major depressive disorder     Melena     Mumps 03/2019    Osteoporosis     bilateral knees    Overactive bladder     Psoriasis     Psoriasis     STEMI (ST elevation myocardial infarction) (HCC)     Type 2 diabetes mellitus ([facility] [address]        History reviewed. No pertinent surgical history. Social History: Rocio Chaudhary  reports that she quit smoking about 7 years ago. She has never used smokeless tobacco. She reports that she does not drink alcohol or use drugs. Family History   Problem Relation Age of Onset    Hypertension Mother     Heart Disease Mother    Avalos Jonathan Migraines Mother     Hypertension Father     Heart Disease Father     Hypertension Sister     Heart Disease Sister     Migraines Sister     Hypertension Brother     Heart Disease Brother     Cancer Brother     Migraines Maternal Grandmother     Hypertension Maternal Grandmother     Heart Disease Maternal Grandmother     Migraines Maternal Grandfather     Hypertension Maternal Grandfather     Heart Disease Maternal Grandfather        On exam: Blood pressure 130/64, pulse 79, height 5' 2\" (1.575 m), weight (!) 307 lb 12.8 oz (139.6 kg). GENERAL  Appears comfortable and in no distress   HEENT  NC/ AT   NECK  Supple and no bruits heard   MENTAL STATUS:  Alert, oriented, intact memory, no confusion, normal speech, normal language, no hallucination or delusion   CRANIAL NERVES: II     -      PERRLA, Fundi reveal intact venous pulsations;  Visual fields intact to confrontation  III,IV,VI -  EOMs full, no afferent defect, no                      ELIS, no ptosis  V     -     Normal facial sensation  VII    -     Normal facial symmetry  VIII   -     Intact hearing IX,X -     Symmetrical palate  XI    -     Symmetrical shoulder shrug  XII   -     Midline tongue, no atrophy    MOTOR FUNCTION:  significant for limited motor exam in bilateral lower extremities due to tenderness in both lower legs; otherwise good strength of grade 5/5 in bilateral proximal and distal muscle groups of both upper extremities with normal bulk, normal tone and no involuntary movements, no tremor   SENSORY FUNCTION:  Impaired light touch, pinprick in distal lower extremities and upper extremities in stocking - global pattern    CEREBELLAR FUNCTION:  Intact fine motor control over upper limbs   REFLEX FUNCTION:  Symmetric 1+ DTRs in both UE; hypoactive DTRS at both knees and absent ankle jerks bilaterally   STATION and GAIT  wide based gait; could not do tandem gait; able to walk using walker. EMG bilateral UE (2/7/19): There is evidence of median neuropathy at both wrists suggestive of bilateral carpal tunnel syndrome of mild severity, right > left. This study did not demonstrate any electrodiagnostic evidence of cervical radiculopathy or brachial plexopathy in the nerves and muscles tested. Clinical correlation is recommended          Impression and Plan: Ms. Annie Bassett is a 81 y.o. female with   Acute symptomatic DVT; reportedly patient was told that she had DVT in left lower extremity; did not bring the report. Spoke to patient's PCP, Dr. Fabrice Dawson and he prefers patient to be seen in nearest ED and treated accordingly. Marginally controlled diabetic neuropathy pain: Continue gabapentin 800 mg tid along with Flexeril. Hx of rash with topical cream, with amitriptyline and baclofen and ketamine; used only for one day and stopped using it. Bilateral CTS: Scripts for bilateral wrist splints given. Given the hx of suicidal ideations; she should not take Duloxetine and patient very clearly understood about it.    Follow up in 2 months

## 2019-09-24 NOTE — TELEPHONE ENCOUNTER
[facility] called saying that they received a call from Dr. Leda Proctor office that we had sent Nobie Handler to the ED. She ask what ED we sent her too. I spoke with Dr. Eboni Arvizu and let [facility] know that we sent her to [facility] ED. She voiced understanding.

## 2019-10-01 ENCOUNTER — TELEPHONE (OUTPATIENT)
Dept: PHARMACY | Age: 69
End: 2019-10-01

## 2019-10-10 ENCOUNTER — TELEPHONE (OUTPATIENT)
Dept: PHARMACY | Age: 69
End: 2019-10-10

## 2020-02-11 ENCOUNTER — OFFICE VISIT (OUTPATIENT)
Dept: NEUROLOGY | Age: 70
End: 2020-02-11
Payer: COMMERCIAL

## 2020-02-11 VITALS
DIASTOLIC BLOOD PRESSURE: 66 MMHG | SYSTOLIC BLOOD PRESSURE: 126 MMHG | BODY MASS INDEX: 55.32 KG/M2 | WEIGHT: 293 LBS | HEART RATE: 88 BPM | HEIGHT: 61 IN

## 2020-02-11 PROCEDURE — 99214 OFFICE O/P EST MOD 30 MIN: CPT | Performed by: PSYCHIATRY & NEUROLOGY

## 2020-02-11 PROCEDURE — 1090F PRES/ABSN URINE INCON ASSESS: CPT | Performed by: PSYCHIATRY & NEUROLOGY

## 2020-02-11 PROCEDURE — G8427 DOCREV CUR MEDS BY ELIG CLIN: HCPCS | Performed by: PSYCHIATRY & NEUROLOGY

## 2020-02-11 PROCEDURE — 3046F HEMOGLOBIN A1C LEVEL >9.0%: CPT | Performed by: PSYCHIATRY & NEUROLOGY

## 2020-02-11 PROCEDURE — 1036F TOBACCO NON-USER: CPT | Performed by: PSYCHIATRY & NEUROLOGY

## 2020-02-11 PROCEDURE — 4040F PNEUMOC VAC/ADMIN/RCVD: CPT | Performed by: PSYCHIATRY & NEUROLOGY

## 2020-02-11 PROCEDURE — G8400 PT W/DXA NO RESULTS DOC: HCPCS | Performed by: PSYCHIATRY & NEUROLOGY

## 2020-02-11 PROCEDURE — 3017F COLORECTAL CA SCREEN DOC REV: CPT | Performed by: PSYCHIATRY & NEUROLOGY

## 2020-02-11 PROCEDURE — G8484 FLU IMMUNIZE NO ADMIN: HCPCS | Performed by: PSYCHIATRY & NEUROLOGY

## 2020-02-11 PROCEDURE — 1123F ACP DISCUSS/DSCN MKR DOCD: CPT | Performed by: PSYCHIATRY & NEUROLOGY

## 2020-02-11 PROCEDURE — G8417 CALC BMI ABV UP PARAM F/U: HCPCS | Performed by: PSYCHIATRY & NEUROLOGY

## 2020-02-11 PROCEDURE — 2022F DILAT RTA XM EVC RTNOPTHY: CPT | Performed by: PSYCHIATRY & NEUROLOGY

## 2020-02-11 RX ORDER — GABAPENTIN 600 MG/1
TABLET ORAL
Qty: 90 TABLET | Refills: 3 | Status: SHIPPED | OUTPATIENT
Start: 2020-02-11 | End: 2020-07-30 | Stop reason: SDUPTHER

## 2020-02-11 RX ORDER — AZELASTINE HCL 205.5 UG/1
2 SPRAY NASAL 2 TIMES DAILY
COMMUNITY

## 2020-02-11 RX ORDER — ACETAMINOPHEN 325 MG/1
650 TABLET ORAL EVERY 4 HOURS PRN
COMMUNITY

## 2020-02-11 NOTE — PROGRESS NOTES
NEUROLOGY FOLLOW-UP  Patient Name:  Lilliana Engel  :   1950  Clinic Visit Date: 2020        REVIEW OF SYSTEMS  Constitutional Weight changes: present, Fevers : present, Fatigue: present; Any recent hospitalizations:  absent.    HEENT Ears: diminished, pain, & ringing, Visual disturbance: present   Reespiratory Shortness of breath: present, Cough: present   Cardivascular Chest pain: absent, Leg swelling :present   GI Constipation: absent, Diarrhea: present, Swallowing change: present    Urinary frequency: absent, Urinary urgency: present   Musculoskeletal Neck pain: present, Back pain: present, Stiffness: present, Muscle pain: present, Joint pain: present   Dermatological Hair loss: absent, Skin changes: present   Neurological Memory loss: present, Confusion: present, Seizures: present; Trouble walking or imbalance: present, Dizziness: present, Weakness: present, Numbness present, Tremor: present, Spasm: present, Speech difficulty: present, Headache: present, Light sensitivity: present   Psychiatric Anxiety: present, Depression  present, Suicidal ideations absent   Hematologic Abnormal bleeding: present, Anemia: absent, Clotting disorder: absent, Lymph gland changes: absent

## 2020-02-11 NOTE — PROGRESS NOTES
NEUROLOGY FOLLOW UP VISIT  Patient Name:       Candice March  :        1950  Clinic Visit Date:    2020    Dear Dr. Renetta Whitlock, DO     I saw Ms. Candice March in follow-up in the office today in continuation of neurologic care. As you know she  is a 81 y.o. right handed  female with uncontrolled diabetic neuropathic pain. During the last visit on 2019; patient was sent from the office to [facility] ED for evaluation of left leg pain with possible DVT. Report of outside venous duplex study revealed positive for acute DVT in left superficial femoral vein. Patient was started on Xarelto at [facility]'s ED and discharged to her nursing home. She is brought to the clinic by caregivers from [facility]. Patient stated that after starting Xarelto, she had problems with it with rectal bleed and it was stopped. She comes to the clinic stating that her weakness in lower extremities has been getting worse attributed to severe arthritis in both knee joints and ankle joints. She is still taking Gabapentin 600 mg tid and it has been helping enough and she does not feel comfortable increasing the dose any further because of concern of increased drowsiness. She also stated that she had allergic rash (without SOB) with use of amitriptyline and baclofen and ketamine cream and she used it only for one day and immediately stopped using it. Since then rash has been improving. She also stated that she has neck pain and MRI cervical spine at [facility] on 19 and she was told to have arthritis in the neck. Of note; she was initially seen here in neurology consultation on 19 for c/o  numbness and tingling along with pain in lower extremities extending upwards including both hands right greater than left for the past \"patient couple of years\".   She comes to clinic stating that gabapentin and flexeril has been helping some for her times daily.  nystatin (MYCOSTATIN) 670681 UNIT/GM cream Apply topically 2 times daily Apply under abdominal folds and breasts four times per day. Twice per day mix with triamcinolone 0.1% cream and apply under breasts.  Menthol, Topical Analgesic, (BIOFREEZE) 4 % GEL Apply topically 4 times daily      folic acid (FOLVITE) 1 MG tablet Take 1 mg by mouth daily      propranolol (INDERAL) 40 MG tablet Take 40 mg by mouth daily as needed (For tremors) For tremors      triamcinolone (KENALOG) 0.1 % cream Apply topically 2 times daily Apply topically 2 times daily under breast and skin folds. Mix with nystatin cream.      rivaroxaban (XARELTO STARTER PACK) 15 & 20 MG Starter Pack Use as directed: 15mg twice daily for 21 days, then 20mg daily. Follow up with [facility]. 1 Package 0    methotrexate (RHEUMATREX) 2.5 MG chemo tablet Take 2.5 mg by mouth once a week Indications: take six tablets once weekly on Mondays       predniSONE (DELTASONE) 5 MG tablet Take 5 mg by mouth daily       buPROPion (WELLBUTRIN XL) 300 MG extended release tablet Take 300 mg by mouth every morning      gabapentin (NEURONTIN) 600 MG tablet TAKE ONE TAB TID (Patient taking differently: Take 300 mg by mouth 3 times daily.  TAKE ONE TAB TID) 90 tablet 3    Amitriptyline HCl POWD Baclofen 1%, Amitriptyline 4 %, Ketamine 2 % in gel form, apply to affected area BID PRN 1 Bottle 0    sodium chloride (OCEAN) 0.65 % nasal spray 1 spray by Nasal route as needed for Congestion      Vitamins A & D (VITAMIN A & D) ointment Apply topically 2 times daily Apply to arms and back      insulin glargine (BASAGLAR KWIKPEN) 100 UNIT/ML injection pen Inject 80 Units into the skin 2 times daily       aspirin 81 MG chewable tablet Take 81 mg by mouth daily       cyclobenzaprine (FLEXERIL) 10 MG tablet Take 10 mg by mouth 3 times daily as needed      escitalopram (LEXAPRO) 5 MG tablet Take 5 mg by mouth daily      insulin medications on file prior to visit. Allergies: Maricruz Downing is allergic to adhesive tape; alcohol; benzoyl peroxide; metformin and related; penicillins; seasonal; vancomycin; and vicodin [hydrocodone-acetaminophen]. Past Medical History:   Diagnosis Date    Acute hypokalemia     Anxiety     Chronic pain syndrome     Esophageal reflux     Hypertension     Insomnia     Major depressive disorder     Melena     Mumps 03/2019    Osteoporosis     bilateral knees    Overactive bladder     Psoriasis     Psoriasis     STEMI (ST elevation myocardial infarction) ([facility])     Tinnitus     Torn meniscus     Type 2 diabetes mellitus ([facility] [address]        History reviewed. No pertinent surgical history. Social History: Maricruz Downing  reports that she quit smoking about 8 years ago. She has never used smokeless tobacco. She reports that she does not drink alcohol or use drugs. Family History   Problem Relation Age of Onset    Hypertension Mother     Heart Disease Mother    [facility] Migraines Mother     Hypertension Father     Heart Disease Father     Hypertension Sister     Heart Disease Sister     Migraines Sister     Hypertension Brother     Heart Disease Brother     Cancer Brother     Migraines Maternal Grandmother     Hypertension Maternal Grandmother     Heart Disease Maternal Grandmother     Migraines Maternal Grandfather     Hypertension Maternal Grandfather     Heart Disease Maternal Grandfather        On exam: Blood pressure 126/66, pulse 88, height 5' 1\" (1.549 m), weight 299 lb (135.6 kg). GENERAL  Appears comfortable and in no distress   HEENT  NC/ AT   NECK  Supple and no bruits heard   MENTAL STATUS:  Alert, oriented, intact memory, no confusion, normal speech, normal language, no hallucination or delusion; appropriate affect. CRANIAL NERVES: II     -      PERRLA, Fundi reveal intact venous pulsations;  Visual fields intact to confrontation  III,IV,VI -  EOMs full, no afferent defect, splints. Symptomatic DVT; abnormal venous Doppler study; was started on Xarelto; under care of her PCP, dr Laya Dhaliwal. Follow up in 6 months. Please note that portions of this note were completed with a voice recognition program.  Although every effort was made to ensure the accuracy of this  automated transcription, some errors in transcription may have occurred, occasionally words are mis-transcribed.

## 2020-07-30 ENCOUNTER — OFFICE VISIT (OUTPATIENT)
Dept: NEUROLOGY | Age: 70
End: 2020-07-30
Payer: COMMERCIAL

## 2020-07-30 VITALS
DIASTOLIC BLOOD PRESSURE: 79 MMHG | SYSTOLIC BLOOD PRESSURE: 168 MMHG | HEART RATE: 70 BPM | BODY MASS INDEX: 57.52 KG/M2 | TEMPERATURE: 97.1 F | HEIGHT: 60 IN | WEIGHT: 293 LBS

## 2020-07-30 PROCEDURE — 4040F PNEUMOC VAC/ADMIN/RCVD: CPT | Performed by: PSYCHIATRY & NEUROLOGY

## 2020-07-30 PROCEDURE — 1123F ACP DISCUSS/DSCN MKR DOCD: CPT | Performed by: PSYCHIATRY & NEUROLOGY

## 2020-07-30 PROCEDURE — 2022F DILAT RTA XM EVC RTNOPTHY: CPT | Performed by: PSYCHIATRY & NEUROLOGY

## 2020-07-30 PROCEDURE — G8417 CALC BMI ABV UP PARAM F/U: HCPCS | Performed by: PSYCHIATRY & NEUROLOGY

## 2020-07-30 PROCEDURE — 3017F COLORECTAL CA SCREEN DOC REV: CPT | Performed by: PSYCHIATRY & NEUROLOGY

## 2020-07-30 PROCEDURE — G8400 PT W/DXA NO RESULTS DOC: HCPCS | Performed by: PSYCHIATRY & NEUROLOGY

## 2020-07-30 PROCEDURE — 99214 OFFICE O/P EST MOD 30 MIN: CPT | Performed by: PSYCHIATRY & NEUROLOGY

## 2020-07-30 PROCEDURE — G8427 DOCREV CUR MEDS BY ELIG CLIN: HCPCS | Performed by: PSYCHIATRY & NEUROLOGY

## 2020-07-30 PROCEDURE — 1036F TOBACCO NON-USER: CPT | Performed by: PSYCHIATRY & NEUROLOGY

## 2020-07-30 PROCEDURE — 3046F HEMOGLOBIN A1C LEVEL >9.0%: CPT | Performed by: PSYCHIATRY & NEUROLOGY

## 2020-07-30 PROCEDURE — 1090F PRES/ABSN URINE INCON ASSESS: CPT | Performed by: PSYCHIATRY & NEUROLOGY

## 2020-07-30 RX ORDER — GABAPENTIN 600 MG/1
TABLET ORAL
Qty: 90 TABLET | Refills: 3 | Status: SHIPPED | OUTPATIENT
Start: 2020-07-30 | End: 2021-02-04 | Stop reason: SDUPTHER

## 2020-07-30 NOTE — PROGRESS NOTES
NEUROLOGY FOLLOW-UP  Patient Name:  Cristina Goldberg  :   1950  Clinic Visit Date: 2020        REVIEW OF SYSTEMS  Constitutional Weight changes: present, Fevers : absent, Fatigue: present; Any recent hospitalizations:  absent.    HEENT Ears: diminished, Eyes: glasses, Visual disturbance: absent   Reespiratory Shortness of breath: absent, Cough: absent   Cardivascular Chest pain: absent, Leg swelling :absent   GI Constipation: absent, Diarrhea: absent, Swallowing change: absent    Urinary frequency: absent, Urinary urgency: absent, Urinary incontinence: absent   Musculoskeletal Neck pain: present, Back pain: present, Stiffness: present, Muscle pain: absent, Joint pain: present   Dermatological Hair loss: absent, Skin changes: absent   Neurological Memory loss: absent, Confusion: absent, Seizures: absent; Trouble walking or imbalance: absent, Dizziness: absent, Weakness: absent, Numbness present, Tremor: present, Spasm: present, Speech difficulty: absent, Headache: absent, Light sensitivity: absent   Psychiatric Anxiety: absent, Depression  present, Suicidal ideations absent   Hematologic Abnormal bleeding: absent, Anemia: absent, Clotting disorder: absent, Lymph gland changes: absent

## 2020-07-30 NOTE — PROGRESS NOTES
with diabetic neuropathy 16 years ago and she had insulin-dependent diabetes since early 1990s. She has been on gabapentin 800 mg tid with no relief. She tried Lyrica in the past without any help. she is presently on Tylenol # 3 with \"some help\". She also has been having spasms/ cramps in both calves and Flexeril gives her moderate relief. She also has been having progressive balance difficulties and she had multiple falls. Denied head injuries and loss of consciousness. She also c/o intermittent jerking sensation involving both upper and lower extremities and at times associated with increased sleepiness. Denied syncopal episodes. She has been using wrist splints for carpal tunnel syndrome. She does admit to having nocturnal awakenings. She has had carpal tunnel release in the past along with the left ulnar nerve transposition. Review of systems done by staff reviewed and pertinent positives include numbness, painful sensations, joint pain, neck pain, back pain, balance difficulties, anxiety and depression. Denies suicidal ideation. She had lumbar epidural injections through pain clinic on Tuesday 7/28/20. Current Outpatient Medications on File Prior to Visit   Medication Sig Dispense Refill    acetaminophen (TYLENOL) 325 MG tablet Take 650 mg by mouth every 4 hours as needed for Pain      mineral oil-hydrophilic petrolatum (AQUAPHOR) ointment Apply topically 2 times daily Apply topically as needed.  azelastine HCl 0.15 % SOLN 2 sprays by Nasal route 2 times daily      aluminum chloride (DRYSOL) 20 % external solution Apply topically See Admin Instructions Daily on Tue, Thurs, Sat      linaclotide (LINZESS) 145 MCG capsule Take 72.5 mcg by mouth every morning (before breakfast)      hydrocortisone (PROCTOCORT) 1 % cream Apply topically 2 times daily as needed Apply topically 2 times daily.       gabapentin (NEURONTIN) 600 MG tablet TAKE ONE TAB TID 90 tablet 3    nystatin (MYCOSTATIN) 81 MG chewable tablet Take 81 mg by mouth daily       cyclobenzaprine (FLEXERIL) 10 MG tablet Take 10 mg by mouth 3 times daily as needed      escitalopram (LEXAPRO) 5 MG tablet Take 10 mg by mouth daily       fluticasone (FLONASE) 50 MCG/ACT nasal spray 1 spray by Nasal route daily       furosemide (LASIX) 40 MG tablet Take 40 mg by mouth daily      insulin lispro (HUMALOG KWIKPEN) 100 UNIT/ML pen Inject into the skin 3 times daily (before meals) BG<70 call MD, 110-150: 4 units, 151-200: 6 units, 201-250: 8 units, 251-300: 10 units, 301-350: 12 units, 351-400: 14 units, >400 call MD three times per day with meals      losartan (COZAAR) 100 MG tablet Take 100 mg by mouth daily      polyethylene glycol (GLYCOLAX) powder Take 17 g by mouth 2 times daily       omeprazole (PRILOSEC) 20 MG delayed release capsule Take 20 mg by mouth 2 times daily       potassium chloride (MICRO-K) 10 MEQ extended release capsule Take 10 mEq by mouth daily      spironolactone (ALDACTONE) 50 MG tablet Take 50 mg by mouth 2 times daily      tolterodine (DETROL LA) 2 MG extended release capsule Take 2 mg by mouth daily      traZODone (DESYREL) 100 MG tablet Take 100 mg by mouth nightly       acetaminophen-codeine (TYLENOL #3) 300-30 MG per tablet Take  mg by mouth every 6 hours as needed. Alejandro Feliciano Liraglutide (VICTOZA) 18 MG/3ML SOPN SC injection Inject 18 mg into the skin daily      diclofenac sodium 1 % GEL Apply 2 g topically 4 times daily as needed for Pain       B Complex Vitamins (VITAMIN B COMPLEX) TABS Take by mouth daily      docusate sodium (COLACE) 100 MG capsule Take 100 mg by mouth daily as needed for Constipation      melatonin 3 MG TABS tablet Take 3 mg by mouth nightly        No current facility-administered medications on file prior to visit.       Allergies: Diana Law is allergic to adhesive tape; alcohol; benzoyl peroxide; metformin and related; penicillins; seasonal; vancomycin; and vicodin [hydrocodone-acetaminophen]. Past Medical History:   Diagnosis Date    Acute hypokalemia     Anxiety     Chronic pain syndrome     Esophageal reflux     Hypertension     Insomnia     Major depressive disorder     Melraj     Mumps 03/2019    Osteoporosis     bilateral knees    Overactive bladder     Psoriasis     Psoriasis     [facility]) ([facility])     Tinnitus     Torn meniscus     Type 2 diabetes mellitus ([facility] [address]        No past surgical history on file. Social History: Avni Valadez  reports that she quit smoking about 8 years ago. She has never used smokeless tobacco. She reports that she does not drink alcohol or use drugs. Family History   Problem Relation Age of Onset    Hypertension Mother     Heart Disease Mother    Yasmin Marina Migraines Mother     Hypertension Father     Heart Disease Father     Hypertension Sister     Heart Disease Sister     Migraines Sister     Hypertension Brother     Heart Disease Brother     Cancer Brother     Migraines Maternal Grandmother     Hypertension Maternal Grandmother     Heart Disease Maternal Grandmother     Migraines Maternal Grandfather     Hypertension Maternal Grandfather     Heart Disease Maternal Grandfather        On exam: Blood pressure (!) 168/79, pulse 70, temperature 97.1 °F (36.2 °C), height 5' (1.524 m), weight 294 lb (133.4 kg). GENERAL  Appears comfortable and in no distress   HEENT  NC/ AT   NECK  Supple and no bruits heard   MENTAL STATUS:  Alert, oriented, intact memory, no confusion, normal speech, normal language, no hallucination or delusion; appropriate affect. CRANIAL NERVES: II     -      PERRLA, Fundi reveal intact venous pulsations;  Visual fields intact to confrontation  III,IV,VI -  EOMs full, no afferent defect, no ELIS, no ptosis  V     -     Normal facial sensation  VII    -     Normal facial symmetry  VIII   -     Intact hearing  IX,X -     Symmetrical palate  XI    -     Symmetrical shoulder shrug  XII   -     Midline tongue, no atrophy    MOTOR FUNCTION:  significant for limited motor exam in bilateral lower extremities due to tenderness in both lower legs; otherwise good strength of grade 5/5 in bilateral proximal and distal muscle groups of both upper extremities with normal bulk, normal tone and no involuntary movements, no tremor   SENSORY FUNCTION:  Impaired light touch, pinprick in distal lower extremities and upper extremities in stocking - global pattern    CEREBELLAR FUNCTION:  Intact fine motor control over upper limbs   REFLEX FUNCTION:  Symmetric 1+ DTRs in both UE; hypoactive patellar DTRS and absent ankle jerks bilaterally   STATION and GAIT  wide based gait; could not do tandem gait; able to walk using walker with seat     EMG bilateral UE (2/7/19): There is evidence of median neuropathy at both wrists suggestive of bilateral carpal tunnel syndrome of mild severity, right > left. This study did not demonstrate any electrodiagnostic evidence of cervical radiculopathy or brachial plexopathy in the nerves and muscles tested. Clinical correlation is recommended          Impression and Plan: Ms. Gertrudis López is a 78 y.o. female with   Diabetic neuropathic pain; tolerable with Gabapentin 600 mg tid; higher doses of Gabapentin \"made drowsy\" therefore, will continue the same dose of Gabapentin 600 tid at her request.   Muscle spasms in bilateral lower extre with diabetic neuropathic pain; continue Flexeril 10 mg bid. Given the hx of suicidal ideations; she should not take Duloxetine and patient very clearly understood about it. Hx of rash with topical cream, with amitriptyline and baclofen and ketamine. Bilateral CTS: stable; was using bilateral wrist splints.       Symptomatic DVT; abnormal venous Doppler study; was started on Xarelto but later stopped due to rectal bleed; on Stockings to both lower extremities; under care of vascular surgeon, Dr. Anthony Mcdaniel at [facility] [place]. Follow up in 6 months. Please note that portions of this note were completed with a voice recognition program.  Although every effort was made to ensure the accuracy of this  automated transcription, some errors in transcription may have occurred, occasionally words are mis-transcribed.

## 2021-02-04 ENCOUNTER — OFFICE VISIT (OUTPATIENT)
Dept: NEUROLOGY | Age: 71
End: 2021-02-04
Payer: COMMERCIAL

## 2021-02-04 VITALS
SYSTOLIC BLOOD PRESSURE: 130 MMHG | TEMPERATURE: 97.9 F | BODY MASS INDEX: 54.75 KG/M2 | WEIGHT: 290 LBS | HEART RATE: 72 BPM | HEIGHT: 61 IN | DIASTOLIC BLOOD PRESSURE: 73 MMHG

## 2021-02-04 DIAGNOSIS — G56.03 BILATERAL CARPAL TUNNEL SYNDROME: ICD-10-CM

## 2021-02-04 DIAGNOSIS — E11.42 DIABETIC POLYNEUROPATHY ASSOCIATED WITH TYPE 2 DIABETES MELLITUS (HCC): Primary | ICD-10-CM

## 2021-02-04 DIAGNOSIS — M79.2 INTRACTABLE NEUROPATHIC PAIN OF LOWER EXTREMITY, UNSPECIFIED LATERALITY: ICD-10-CM

## 2021-02-04 DIAGNOSIS — Z86.718 PERSONAL HISTORY OF DVT (DEEP VEIN THROMBOSIS): ICD-10-CM

## 2021-02-04 PROCEDURE — G8417 CALC BMI ABV UP PARAM F/U: HCPCS | Performed by: PSYCHIATRY & NEUROLOGY

## 2021-02-04 PROCEDURE — 1090F PRES/ABSN URINE INCON ASSESS: CPT | Performed by: PSYCHIATRY & NEUROLOGY

## 2021-02-04 PROCEDURE — 3046F HEMOGLOBIN A1C LEVEL >9.0%: CPT | Performed by: PSYCHIATRY & NEUROLOGY

## 2021-02-04 PROCEDURE — G8484 FLU IMMUNIZE NO ADMIN: HCPCS | Performed by: PSYCHIATRY & NEUROLOGY

## 2021-02-04 PROCEDURE — 3017F COLORECTAL CA SCREEN DOC REV: CPT | Performed by: PSYCHIATRY & NEUROLOGY

## 2021-02-04 PROCEDURE — 1036F TOBACCO NON-USER: CPT | Performed by: PSYCHIATRY & NEUROLOGY

## 2021-02-04 PROCEDURE — 1123F ACP DISCUSS/DSCN MKR DOCD: CPT | Performed by: PSYCHIATRY & NEUROLOGY

## 2021-02-04 PROCEDURE — 4040F PNEUMOC VAC/ADMIN/RCVD: CPT | Performed by: PSYCHIATRY & NEUROLOGY

## 2021-02-04 PROCEDURE — G8400 PT W/DXA NO RESULTS DOC: HCPCS | Performed by: PSYCHIATRY & NEUROLOGY

## 2021-02-04 PROCEDURE — 99214 OFFICE O/P EST MOD 30 MIN: CPT | Performed by: PSYCHIATRY & NEUROLOGY

## 2021-02-04 PROCEDURE — G8427 DOCREV CUR MEDS BY ELIG CLIN: HCPCS | Performed by: PSYCHIATRY & NEUROLOGY

## 2021-02-04 PROCEDURE — 2022F DILAT RTA XM EVC RTNOPTHY: CPT | Performed by: PSYCHIATRY & NEUROLOGY

## 2021-02-04 RX ORDER — GABAPENTIN 600 MG/1
TABLET ORAL
Qty: 90 TABLET | Refills: 6 | Status: SHIPPED | OUTPATIENT
Start: 2021-02-04 | End: 2022-02-05

## 2021-02-04 NOTE — PROGRESS NOTES
NEUROLOGY FOLLOW UP VISIT  Patient Name:       Jose Rosado  :        1950  Clinic Visit Date:    2021  Last visit date: 2020      Dear Dr. Anisa Cagle, DO     I saw Ms. Jose Rosado in follow-up in the office today in continuation of neurologic care. As you know she  is a 78 y.o. right handed  female with uncontrolled diabetic neuropathic pain. She is brought to the clinic for follow up by caregivers from [facility]. Patient stated that gabapentin has been helpful for neuropathic pain in lower extremities. She does not feel that she needs to increase the dose stating \"I am happy with present dose\". She had DVT of lower extremities for which she was on Xarelto; it caused rectal bleed and it was stopped. She has weakness associated with arthritis and knee joints and ankle joints. She is getting therapy. .   Of note; she couldn't take duloxetine, Cymbalta b/o suicidal ideations. She also has back pain for which she is getting epidural injections through pain clinic. She also stated that she had allergic rash (without SOB) with use of amitriptyline and baclofen and ketamine cream and she used it only for one day and immediately stopped using it. Since then rash has improved. She also stated that she has neck pain and MRI cervical spine at [facility] on 19 and she was told to have arthritis in the neck. Of note; she was initially seen here in neurology consultation on 19 for c/o  numbness and tingling along with pain in lower extremities extending upwards including both hands right greater than left for the past \"patient couple of years\". She comes to clinic stating that gabapentin and flexeril has been helping some for her abnormal pain in bilateral lower extremities. Also admits to neck and lower back pain but denied radiating pain along upper or lower extremities.    She also stated that she was diagnosed [id number] UNIT/GM cream Apply topically 2 times daily Apply under abdominal folds and breasts four times per day. Twice per day mix with triamcinolone 0.1% cream and apply under breasts.  Menthol, Topical Analgesic, (BIOFREEZE) 4 % GEL Apply topically 4 times daily      loratadine (CLARITIN) 10 MG tablet Take 10 mg by mouth daily      fluconazole (DIFLUCAN) 100 MG tablet Take 100 mg by mouth daily      folic acid (FOLVITE) 1 MG tablet Take 1 mg by mouth daily      mupirocin (BACTROBAN) 2 % ointment Apply topically 2 times daily Apply to nostrils twice daily      propranolol (INDERAL) 40 MG tablet Take 40 mg by mouth daily as needed (For tremors) For tremors      triamcinolone (KENALOG) 0.1 % cream Apply topically 2 times daily Apply topically 2 times daily under breast and skin folds. Mix with nystatin cream.      rivaroxaban (XARELTO STARTER PACK) 15 & 20 MG Starter Pack Use as directed: 15mg twice daily for 21 days, then 20mg daily. Follow up with [facility].  1 Package 0    meclizine (ANTIVERT) 25 MG tablet Take 25 mg by mouth 3 times daily as needed      methotrexate (RHEUMATREX) 2.5 MG chemo tablet Take 2.5 mg by mouth once a week Indications: take six tablets once weekly on Mondays       predniSONE (DELTASONE) 5 MG tablet Take 5 mg by mouth daily       buPROPion (WELLBUTRIN XL) 300 MG extended release tablet Take 300 mg by mouth every morning      Amitriptyline HCl POWD Baclofen 1%, Amitriptyline 4 %, Ketamine 2 % in gel form, apply to affected area BID PRN 1 Bottle 0    sodium chloride (OCEAN) 0.65 % nasal spray 1 spray by Nasal route as needed for Congestion      Vitamins A & D (VITAMIN A & D) ointment Apply topically 2 times daily Apply to arms and back      Naproxen (EC NAPROSYN) 375 MG EC tablet Take 375 mg by mouth 2 times daily (with meals)      insulin glargine (BASAGLAR KWIKPEN) 100 UNIT/ML injection pen Inject 80 Units into the skin 2 times daily       aspirin 81 MG chewable tablet Take 81 mg by mouth daily       cyclobenzaprine (FLEXERIL) 10 MG tablet Take 10 mg by mouth 3 times daily as needed      escitalopram (LEXAPRO) 5 MG tablet Take 10 mg by mouth daily       fluticasone (FLONASE) 50 MCG/ACT nasal spray 1 spray by Nasal route daily       furosemide (LASIX) 40 MG tablet Take 40 mg by mouth daily      insulin lispro (HUMALOG KWIKPEN) 100 UNIT/ML pen Inject into the skin 3 times daily (before meals) BG<70 call MD, 110-150: 4 units, 151-200: 6 units, 201-250: 8 units, 251-300: 10 units, 301-350: 12 units, 351-400: 14 units, >400 call MD three times per day with meals      losartan (COZAAR) 100 MG tablet Take 100 mg by mouth daily      polyethylene glycol (GLYCOLAX) powder Take 17 g by mouth 2 times daily       omeprazole (PRILOSEC) 20 MG delayed release capsule Take 20 mg by mouth 2 times daily       potassium chloride (MICRO-K) 10 MEQ extended release capsule Take 10 mEq by mouth daily      spironolactone (ALDACTONE) 50 MG tablet Take 50 mg by mouth 2 times daily      tolterodine (DETROL LA) 2 MG extended release capsule Take 2 mg by mouth daily      traZODone (DESYREL) 100 MG tablet Take 100 mg by mouth nightly       acetaminophen-codeine (TYLENOL #3) 300-30 MG per tablet Take  mg by mouth every 6 hours as needed. Orem Flattery Liraglutide (VICTOZA) 18 MG/3ML SOPN SC injection Inject 18 mg into the skin daily      diclofenac sodium 1 % GEL Apply 2 g topically 4 times daily as needed for Pain       B Complex Vitamins (VITAMIN B COMPLEX) TABS Take by mouth daily      docusate sodium (COLACE) 100 MG capsule Take 100 mg by mouth daily as needed for Constipation      melatonin 3 MG TABS tablet Take 3 mg by mouth nightly        No current facility-administered medications on file prior to visit.       Allergies: Mami Lobo is allergic to adhesive tape; alcohol; benzoyl peroxide; metformin and related; penicillins; seasonal; vancomycin; and vicodin [hydrocodone-acetaminophen]. Past Medical History:   Diagnosis Date    Acute hypokalemia     Anxiety     Chronic pain syndrome     Esophageal reflux     Hypertension     Insomnia     Major depressive disorder     Melena     Mumps 03/2019    Osteoporosis     bilateral knees    Overactive bladder     Psoriasis     Psoriasis     [facility]I [facility]ST elevation [facility] infarction) [facility])     Tinnitus     Torn meniscus     Type 2 diabetes mellitus ([facility] [address]        No past surgical history on file. Social History: Eliza Hernandez  reports that she quit smoking about 9 years ago. She has never used smokeless tobacco. She reports that she does not drink alcohol or use drugs. Family History   Problem Relation Age of Onset    Hypertension Mother     Heart Disease Mother    [place] Migraines Mother     Hypertension Father     Heart Disease Father     Hypertension Sister     Heart Disease Sister     Migraines Sister     Hypertension Brother     Heart Disease Brother     Cancer Brother     Migraines Maternal Grandmother     Hypertension Maternal Grandmother     Heart Disease Maternal Grandmother     Migraines Maternal Grandfather     Hypertension Maternal Grandfather     Heart Disease Maternal Grandfather      On exam: Blood pressure 130/73, pulse 72, temperature 97.9 °F (36.6 °C), temperature source Temporal, height 5' 1\" (1.549 m), weight 290 lb (131.5 kg). GENERAL  Appears comfortable and in no distress   HEENT  NC/ AT   NECK  Supple and no bruits heard   MENTAL STATUS:  Alert, oriented, intact memory, no confusion, normal speech, normal language, no hallucination or delusion; appropriate affect. CRANIAL NERVES: II     -      PERRLA, Fundi reveal intact venous pulsations;  Visual fields intact to confrontation  III,IV,VI -  EOMs full, no afferent defect, no ELIS, no ptosis  V     -     Normal facial sensation  VII    -     Normal facial symmetry  VIII   -     Intact hearing  IX,X -     Symmetrical palate  XI    -     Symmetrical shoulder shrug  XII   -     Midline tongue, no atrophy    MOTOR FUNCTION:  significant for limited motor exam in bilateral lower extremities due to tenderness in both lower legs; otherwise good strength of grade 5/5 in bilateral proximal and distal muscle groups of both upper extremities with normal bulk, normal tone and no involuntary movements, no tremor   SENSORY FUNCTION:  Impaired light touch, pinprick in distal lower extremities and upper extremities in stocking - global pattern    CEREBELLAR FUNCTION:  Intact fine motor control over upper limbs   REFLEX FUNCTION:  Symmetric 1+ DTRs in both UE; hypoactive patellar DTRS and absent ankle jerks bilaterally   STATION and GAIT  wide based gait; could not do tandem gait; able to walk using walker with seat     EMG bilateral UE (2/7/19): There is evidence of median neuropathy at both wrists suggestive of bilateral carpal tunnel syndrome of mild severity, right > left. This study did not demonstrate any electrodiagnostic evidence of cervical radiculopathy or brachial plexopathy in the nerves and muscles tested. Clinical correlation is recommended          Impression and Plan: Ms. Camila Bentley is a 78 y.o. female with   Diabetic neuropathic pain; tolerable w/ Gabapentin 600 mg tid; couldn't tolerate any higher doses; to continue Gabapentin 600 tid   Muscle spasms in bilateral lower extre with diabetic neuropathic pain; continue Flexeril 10 mg tid prn. If neuropathic pain recurs; will consider Pregabalin or Tapentadol (Nucynta). Given the hx of suicidal ideations; she should not take Duloxetine and patient very clearly understood about it. Med Intolerance: Hx of rash with topical cream, with amitriptyline and baclofen and ketamine. Bilateral CTS s/p carpal tunnel release: resolved; not using wrist splints.       Symptomatic DVT; abnormal venous Doppler study; was started on Xarelto but later stopped due to rectal bleed; on Stockings to both lower extremities; under care of vascular surgeon, Dr. Elana Silva at [address]. Follow up in 6 months. Please note that portions of this note were completed with a voice recognition program.  Although every effort was made to ensure the accuracy of this automated transcription, some errors in transcription may have occurred; occasionally words are mis-transcribed. Thank you for understanding.         (rarely used), and the capsaicin patch.  pregabalin, duloxetine, tapentadol (Nucynta) (rarely u